# Patient Record
Sex: FEMALE | Race: WHITE
[De-identification: names, ages, dates, MRNs, and addresses within clinical notes are randomized per-mention and may not be internally consistent; named-entity substitution may affect disease eponyms.]

---

## 2024-08-05 ENCOUNTER — APPOINTMENT (OUTPATIENT)
Dept: ORTHOPEDIC SURGERY | Facility: CLINIC | Age: 54
End: 2024-08-05

## 2024-08-05 PROBLEM — M19.042 PRIMARY OSTEOARTHRITIS OF LEFT HAND: Status: ACTIVE | Noted: 2024-08-05

## 2024-08-05 PROBLEM — Z00.00 ENCOUNTER FOR PREVENTIVE HEALTH EXAMINATION: Status: ACTIVE | Noted: 2024-08-05

## 2024-08-05 PROCEDURE — 99202 OFFICE O/P NEW SF 15 MIN: CPT

## 2024-08-05 PROCEDURE — 73130 X-RAY EXAM OF HAND: CPT | Mod: LT

## 2024-08-05 NOTE — ASSESSMENT
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  The middle finger being the worst.  The possibility of surgical intervention for fusion was discussed with the patient.  Continued observation was discussed.  She may be getting cortisone injections from her rheumatologist the use of anti-inflammatories was discussed

## 2024-08-05 NOTE — HISTORY OF PRESENT ILLNESS
[de-identified] : 53-year-old female pain discomfort to left hand.  Comes in today for evaluation.  She had this pain discomfort around the index finger middle finger ring finger it affects her with daily activities.  She does report getting the index finger caught in something twisting it and there is been an increase in pain and some swelling since

## 2024-08-05 NOTE — PHYSICAL EXAM
[de-identified] : Patient is deformity left index finger DIP joint middle finger DIP joint and ring finger DIP joint to DIP joint of the middle finger is the most painful.  The ability to flex the fingers.  Limited motion at those joints.

## 2024-08-05 NOTE — DATA REVIEWED
[FreeTextEntry1] : Radiographs 3 views of the right hand are reviewed showing marked arthritis at the DIP joint left index finger especially left middle finger and left ring finger.  There is also some STT arthritis

## 2025-02-10 ENCOUNTER — APPOINTMENT (OUTPATIENT)
Dept: ORTHOPEDIC SURGERY | Facility: CLINIC | Age: 55
End: 2025-02-10

## 2025-03-24 ENCOUNTER — APPOINTMENT (OUTPATIENT)
Dept: ORTHOPEDIC SURGERY | Facility: CLINIC | Age: 55
End: 2025-03-24
Payer: COMMERCIAL

## 2025-03-24 DIAGNOSIS — M19.042 PRIMARY OSTEOARTHRITIS, LEFT HAND: ICD-10-CM

## 2025-03-24 PROCEDURE — 99214 OFFICE O/P EST MOD 30 MIN: CPT
